# Patient Record
Sex: MALE | Race: WHITE | NOT HISPANIC OR LATINO | Employment: OTHER | URBAN - METROPOLITAN AREA
[De-identification: names, ages, dates, MRNs, and addresses within clinical notes are randomized per-mention and may not be internally consistent; named-entity substitution may affect disease eponyms.]

---

## 2017-03-06 PROBLEM — C61 PROSTATE CANCER: Status: ACTIVE | Noted: 2017-03-06

## 2018-01-15 ENCOUNTER — OFFICE VISIT (OUTPATIENT)
Dept: CARDIOLOGY | Facility: CLINIC | Age: 68
End: 2018-01-15
Attending: INTERNAL MEDICINE
Payer: MEDICARE

## 2018-01-15 VITALS
DIASTOLIC BLOOD PRESSURE: 65 MMHG | HEIGHT: 67 IN | SYSTOLIC BLOOD PRESSURE: 120 MMHG | WEIGHT: 144 LBS | BODY MASS INDEX: 22.6 KG/M2 | HEART RATE: 75 BPM

## 2018-01-15 DIAGNOSIS — I10 ESSENTIAL HYPERTENSION: ICD-10-CM

## 2018-01-15 DIAGNOSIS — C61 PROSTATE CANCER: ICD-10-CM

## 2018-01-15 DIAGNOSIS — E78.00 HYPERCHOLESTEROLEMIA: ICD-10-CM

## 2018-01-15 DIAGNOSIS — B20 HUMAN IMMUNODEFICIENCY VIRUS: ICD-10-CM

## 2018-01-15 PROCEDURE — 99214 OFFICE O/P EST MOD 30 MIN: CPT | Mod: S$GLB,,, | Performed by: INTERNAL MEDICINE

## 2018-01-15 RX ORDER — ROSUVASTATIN CALCIUM 20 MG/1
20 TABLET, COATED ORAL DAILY
Qty: 90 TABLET | Refills: 3 | Status: SHIPPED | OUTPATIENT
Start: 2018-01-15 | End: 2018-03-19

## 2018-01-15 RX ORDER — HYDROCHLOROTHIAZIDE 12.5 MG/1
12.5 CAPSULE ORAL DAILY
Qty: 90 CAPSULE | Refills: 3 | Status: SHIPPED | OUTPATIENT
Start: 2018-01-15 | End: 2018-03-19

## 2018-01-15 NOTE — PROGRESS NOTES
Subjective:     Panchito Lebron is a 67 y.o. male with hypertension and hypercholesterolemia. He has been HIV positive since 1986 and been taking antiviral medications since 2004. In 11/2016 he was diagnosed with low grade prostate cancer. No exertional chest pressure or exertional dyspnea. No palpitations or weak spells. Feeling well overall.    Hypertension   This is a chronic problem. The current episode started more than 1 year ago. The problem is unchanged. The problem is controlled (not been followed at home). Pertinent negatives include no anxiety, blurred vision, chest pain, headaches, malaise/fatigue, neck pain, orthopnea, palpitations, peripheral edema, PND, shortness of breath or sweats. There is no history of chronic renal disease.   Hyperlipidemia   This is a chronic problem. The current episode started more than 1 year ago. The problem is controlled. Recent lipid tests were reviewed and are low. He has no history of chronic renal disease, diabetes, hypothyroidism, liver disease, obesity or nephrotic syndrome. Pertinent negatives include no chest pain, focal sensory loss, focal weakness, leg pain, myalgias or shortness of breath.       Review of Systems   Constitution: Negative for chills and malaise/fatigue.   HENT: Negative for nosebleeds and sore throat.    Eyes: Negative for blurred vision, redness, vision loss in left eye and vision loss in right eye.   Cardiovascular: Negative for chest pain, claudication, dyspnea on exertion, irregular heartbeat, leg swelling, near-syncope, orthopnea, palpitations, paroxysmal nocturnal dyspnea and syncope.   Respiratory: Negative for cough, shortness of breath and wheezing.    Endocrine: Negative for cold intolerance and heat intolerance.   Hematologic/Lymphatic: Negative for bleeding problem. Bruises/bleeds easily (of hands).   Skin: Negative for color change and itching.   Musculoskeletal: Negative for back pain, falls, myalgias and neck pain.  "  Gastrointestinal: Negative for heartburn, hematemesis, hematochezia, hemorrhoids, jaundice, melena, nausea and vomiting.   Genitourinary: Negative for dysuria and hematuria.   Neurological: Negative for difficulty with concentration, dizziness, focal weakness, headaches, light-headedness, loss of balance and vertigo.   Psychiatric/Behavioral: Negative for altered mental status, depression and memory loss. The patient is not nervous/anxious.    Allergic/Immunologic: Positive for persistent infections (HIV positive). Negative for hives.       Current Outpatient Prescriptions on File Prior to Visit   Medication Sig Dispense Refill    alendronate (FOSAMAX) 70 MG tablet Take 70 mg by mouth every 7 days.      amlodipine (NORVASC) 10 MG tablet Take 1 tablet (10 mg total) by mouth once daily. 90 tablet 3    efavirenz (SUSTIVA) 600 mg Tab Take 600 mg by mouth every evening.      esomeprazole (NEXIUM) 40 MG capsule Take 40 mg by mouth before breakfast.      hydrocodone-acetaminophen 7.5-325mg (NORCO) 7.5-325 mg per tablet TK 1 T PO  D PRN  0    lamivudine-zidovudine 150-300mg (COMBIVIR) 150-300 mg Tab Take 1 tablet by mouth every 12 (twelve) hours.      pravastatin (PRAVACHOL) 40 MG tablet Take 1 tablet (40 mg total) by mouth once daily. 90 tablet 3    ramipril (ALTACE) 10 MG capsule Take 1 capsule (10 mg total) by mouth once daily. 90 capsule 3    VITAMIN D3 400 unit tablet TK 2 TS PO QD  3     No current facility-administered medications on file prior to visit.        /65   Pulse 75   Ht 5' 7" (1.702 m)   Wt 65.3 kg (144 lb)   BMI 22.55 kg/m²       Objective:     Physical Exam   Constitutional: He is oriented to person, place, and time. He appears well-developed and well-nourished. He does not appear ill. No distress.   HENT:   Head: Normocephalic and atraumatic.   Nose: Nose normal.   Mouth/Throat: No oropharyngeal exudate.   Eyes: Right eye exhibits no discharge. Left eye exhibits no discharge. Right " conjunctiva is not injected. Left conjunctiva is not injected. Right pupil is round. Left pupil is round. Pupils are equal.   Neck: Neck supple. No JVD present. Carotid bruit is not present. No thyromegaly present.   Cardiovascular: Normal rate, regular rhythm, S1 normal and S2 normal.   No extrasystoles are present. Exam reveals gallop and S4. Exam reveals no S3.    Murmur heard.   Midsystolic murmur is present with a grade of 2/6  at the upper right sternal border  Pulses:       Radial pulses are 2+ on the right side, and 2+ on the left side.        Dorsalis pedis pulses are 2+ on the right side, and 2+ on the left side.        Posterior tibial pulses are 2+ on the right side, and 2+ on the left side.   Pulmonary/Chest: Effort normal and breath sounds normal.   Abdominal: Soft. Normal appearance. There is no hepatosplenomegaly. There is no tenderness.   Musculoskeletal:        Right ankle: He exhibits no swelling, no ecchymosis and no deformity.        Left ankle: He exhibits no swelling, no ecchymosis and no deformity.   Lymphadenopathy:        Head (right side): No submandibular adenopathy present.        Head (left side): No submandibular adenopathy present.     He has no cervical adenopathy.   Neurological: He is alert and oriented to person, place, and time. He is not disoriented. No cranial nerve deficit or sensory deficit.   Skin: Skin is warm, dry and intact. No rash noted. He is not diaphoretic. No cyanosis. Nails show no clubbing.   Psychiatric: He has a normal mood and affect. His speech is normal and behavior is normal. Judgment and thought content normal. Cognition and memory are normal.       Assessment:     1. Essential hypertension    2. Hypercholesterolemia    3. Human immunodeficiency virus    4. Prostate cancer        Plan:     1. Hypertension   2004: Diagnosed.   On amlodipine 10 mg Q24 an ramipril 10 mg Q24.   Keeping log at home.   Running high.   Add hctz 12.5 mg Q24.   BMP in 4  weeks.       2. Hypercholesterolemia   2008: Began statin.   12/13/2013: Chol 184. HDL 51. LDL 98. .   On pravastatin 40 mg Q24.   7/10/2014: Chol 183. HDL 47. LDL 97. .   2/24/2015: Chol 184. HDL 42. LDL 82. .   5/1/2016: Chol 164. HDL 45. LDL 73. .   On pravastatin 40 mg Q24.   Change to rosuvastatin 20 mg Q24.   Do lipid panel in 4 weeks.   Well controlled.     3. Human Immunodeficiency Virus   3/1986: Diagnosed.   On medications.   9/26/2014: RNA undetectable. CD4 645.   11/15/2016: CD4 676.   Dr. Blue Dick.   Dr. Devang Siu.    4. Prostate Cancer   11/2016: Diagnosed. [place].   Dr. Anshul Mccrary.    5. Primary Care   Dr. Blue Dick.    F/u 2 months.    Padilla Sprague M.D.

## 2018-01-26 ENCOUNTER — TELEPHONE (OUTPATIENT)
Dept: CARDIOLOGY | Facility: CLINIC | Age: 68
End: 2018-01-26

## 2018-01-26 NOTE — TELEPHONE ENCOUNTER
Patient has not taken HCTZ and Rosuvastatin today. He says since he started the medicines has felt wobbly.    Please call the patient to discuss.

## 2018-01-26 NOTE — TELEPHONE ENCOUNTER
----- Message from Faiza Craig sent at 1/26/2018 10:18 AM CST -----  Having problems with the medications such as feeling off balance and having other symptoms [phone]

## 2018-03-19 ENCOUNTER — OFFICE VISIT (OUTPATIENT)
Dept: CARDIOLOGY | Facility: CLINIC | Age: 68
End: 2018-03-19
Attending: INTERNAL MEDICINE
Payer: MEDICARE

## 2018-03-19 VITALS
SYSTOLIC BLOOD PRESSURE: 134 MMHG | HEART RATE: 70 BPM | DIASTOLIC BLOOD PRESSURE: 66 MMHG | BODY MASS INDEX: 22.29 KG/M2 | WEIGHT: 142 LBS | HEIGHT: 67 IN

## 2018-03-19 DIAGNOSIS — E78.00 HYPERCHOLESTEROLEMIA: ICD-10-CM

## 2018-03-19 DIAGNOSIS — B20 HUMAN IMMUNODEFICIENCY VIRUS: ICD-10-CM

## 2018-03-19 DIAGNOSIS — I10 ESSENTIAL HYPERTENSION: ICD-10-CM

## 2018-03-19 DIAGNOSIS — C61 PROSTATE CANCER: ICD-10-CM

## 2018-03-19 PROCEDURE — 99214 OFFICE O/P EST MOD 30 MIN: CPT | Mod: S$GLB,,, | Performed by: INTERNAL MEDICINE

## 2018-03-19 RX ORDER — PRAVASTATIN SODIUM 40 MG/1
40 TABLET ORAL DAILY
Qty: 90 TABLET | Refills: 3 | Status: SHIPPED | OUTPATIENT
Start: 2018-03-19 | End: 2018-03-19

## 2018-03-19 RX ORDER — ROSUVASTATIN CALCIUM 20 MG/1
20 TABLET, COATED ORAL DAILY
Qty: 90 TABLET | Refills: 3 | Status: SHIPPED | OUTPATIENT
Start: 2018-03-19 | End: 2019-03-19

## 2018-03-19 RX ORDER — AMLODIPINE BESYLATE 10 MG/1
10 TABLET ORAL DAILY
Qty: 90 TABLET | Refills: 3 | Status: SHIPPED | OUTPATIENT
Start: 2018-03-19

## 2018-03-19 RX ORDER — RAMIPRIL 10 MG/1
10 CAPSULE ORAL DAILY
Qty: 90 CAPSULE | Refills: 3 | Status: SHIPPED | OUTPATIENT
Start: 2018-03-19

## 2018-03-19 NOTE — PROGRESS NOTES
Subjective:     Panchito Lebron is a 67 y.o. male with hypertension and hypercholesterolemia. He has been HIV positive since 1986 and been taking antiviral medications since 2004. In 11/2016 he was diagnosed with low grade prostate cancer. No exertional chest pressure or exertional dyspnea. No palpitations or weak spells. Feeling well overall.    Hypertension   This is a chronic problem. The current episode started more than 1 year ago. The problem is unchanged. The problem is controlled (not [facility] Pertinent negatives include no anxiety, blurred vision, chest pain, headaches, malaise/fatigue, neck pain, orthopnea, palpitations, peripheral edema, PND, shortness of breath or sweats. There is no history of chronic renal disease.   Hyperlipidemia   This is a chronic problem. The current episode started more than 1 year ago. The problem is controlled. Recent lipid tests were reviewed and are low. He has no history of chronic renal disease, diabetes, hypothyroidism, liver disease, obesity or nephrotic syndrome. Pertinent negatives include no chest pain, focal sensory loss, focal weakness, leg pain, myalgias or shortness of breath.       Review of Systems   Constitution: Negative for chills and malaise/fatigue.   HENT: Negative for nosebleeds and sore throat.    Eyes: Negative for blurred vision, redness, vision loss in left eye and vision loss in right eye.   Cardiovascular: Negative for chest pain, claudication, dyspnea on exertion, irregular heartbeat, leg swelling, near-syncope, orthopnea, palpitations, paroxysmal nocturnal dyspnea and syncope.   Respiratory: Negative for cough, shortness of breath and wheezing.    Endocrine: Negative for cold intolerance and heat intolerance.   Hematologic/Lymphatic: Negative for bleeding problem. Bruises/bleeds easily (of hands).   Skin: Negative for color change and itching.   Musculoskeletal: Negative for back pain, falls,  "myalgias and neck pain.   Gastrointestinal: Negative for heartburn, hematemesis, hematochezia, hemorrhoids, jaundice, melena, nausea and vomiting.   Genitourinary: Negative for dysuria and hematuria.   Neurological: Negative for difficulty with concentration, dizziness, focal weakness, headaches, light-headedness, loss of balance, tremors and vertigo.   Psychiatric/Behavioral: Negative for altered mental status, depression and memory loss. The patient is not nervous/anxious.    Allergic/Immunologic: Positive for persistent infections (HIV positive). Negative for hives.       Current Outpatient Prescriptions on File Prior to Visit   Medication Sig Dispense Refill    alendronate (FOSAMAX) 70 MG tablet Take 70 mg by mouth every 7 days.      amlodipine (NORVASC) 10 MG tablet Take 1 tablet (10 mg total) by mouth once daily. 90 tablet 3    dolutegravir (TIVICAY) 50 mg Tab Take 50 mg by mouth once daily.      emtricitabine-tenofovir alafen (DESCOVY) 200-25 mg Tab Take by mouth.      esomeprazole (NEXIUM) 40 MG capsule Take 40 mg by mouth before breakfast.      hydroCHLOROthiazide (MICROZIDE) 12.5 mg capsule Take 1 capsule (12.5 mg total) by mouth once daily. 90 capsule 3    hydrocodone-acetaminophen 7.5-325mg (NORCO) 7.5-325 mg per tablet TK 1 T PO  D PRN  0    ramipril (ALTACE) 10 MG capsule Take 1 capsule (10 mg total) by mouth once daily. 90 capsule 3    rosuvastatin (CRESTOR) 20 MG tablet Take 1 tablet (20 mg total) by mouth once daily. 90 tablet 3    VITAMIN D3 400 unit tablet TK 2 TS PO QD  3     No current facility-administered medications on file prior to visit.        /66   Pulse 70   Ht 5' 7" (1.702 m)   Wt 64.4 kg (142 lb)   BMI 22.24 kg/m²       Objective:     Physical Exam   Constitutional: He is oriented to person, place, and time. He appears well-developed and well-nourished. He does not appear ill. No distress.   HENT:   Head: Normocephalic and atraumatic.   Nose: Nose normal. "   Mouth/Throat: No oropharyngeal exudate.   Eyes: Right eye exhibits no discharge. Left eye exhibits no discharge. Right conjunctiva is not injected. Left conjunctiva is not injected. Right pupil is round. Left pupil is round. Pupils are equal.   Neck: Neck supple. No JVD present. Carotid bruit is not present. No thyromegaly present.   Cardiovascular: Normal rate, regular rhythm, S1 normal and S2 normal.   No extrasystoles are present. Exam reveals gallop and S4. Exam reveals no S3.    Murmur heard.   Midsystolic murmur is present with a grade of 2/6  at the upper right sternal border  Pulses:       Radial pulses are 2+ on the right side, and 2+ on the left side.        Dorsalis pedis pulses are 2+ on the right side, and 2+ on the left side.        Posterior tibial pulses are 2+ on the right side, and 2+ on the left side.   Pulmonary/Chest: Effort normal and breath sounds normal.   Abdominal: Soft. Normal appearance. There is no hepatosplenomegaly. There is no tenderness.   Musculoskeletal:        Right ankle: He exhibits no swelling, no ecchymosis and no deformity.        Left ankle: He exhibits no swelling, no ecchymosis and no deformity.   Lymphadenopathy:        Head (right side): No submandibular adenopathy present.        Head (left side): No submandibular adenopathy present.     He has no cervical adenopathy.   Neurological: He is alert and oriented to person, place, and time. He is not disoriented. No cranial nerve deficit or sensory deficit.   Skin: Skin is warm, dry and intact. No rash noted. He is not diaphoretic. No cyanosis. Nails show no clubbing.   Psychiatric: He has a normal mood and affect. His speech is normal and behavior is normal. Judgment and thought content normal. Cognition and memory are normal.       Assessment:     1. Essential hypertension    2. Hypercholesterolemia    3. Human immunodeficiency virus    4. Prostate cancer        Plan:     1. Hypertension   2004: Diagnosed.   On  amlodipine 10 mg Q24 and ramipril 10 mg Q24.   Stopped hctz 12.5 mg Q24 due to possible side effects from hctz.   Keeping log at home.   Running slightly high.       2. Hypercholesterolemia   2008: Began statin.   12/13/2013: Chol 184. HDL 51. LDL 98. .   On pravastatin 40 mg Q24.   7/10/2014: Chol 183. HDL 47. LDL 97. .   2/24/2015: Chol 184. HDL 42. LDL 82. .   5/1/2016: Chol 164. HDL 45. LDL 73. .   On pravastatin 40 mg Q24.   Changed to rosuvastatin 20 mg Q24.   1/18/2018: Chol 150. HDL 41. LDL 60 .   Changed back to pravastatin 40 mg Q24 due to possible side effects from rosuvastatin.   Fairly well controlled.     3. Human Immunodeficiency Virus   3/1986: Diagnosed.   On medications.   9/26/2014: RNA undetectable. CD4 645.   11/15/2016: CD4 676.   Dr. Blue Dick.   Dr. Devang Siu.    4. Prostate Cancer   11/2016: Diagnosed. [place] 6.   Dr. Anshul Mccrary.    5. Primary Care   Dr. Blue Dick.    F/u 4 months.    Padilla Sprague M.D.

## 2018-07-31 ENCOUNTER — OFFICE VISIT (OUTPATIENT)
Dept: CARDIOLOGY | Facility: CLINIC | Age: 68
End: 2018-07-31
Attending: INTERNAL MEDICINE
Payer: MEDICARE

## 2018-07-31 VITALS
BODY MASS INDEX: 22.6 KG/M2 | SYSTOLIC BLOOD PRESSURE: 130 MMHG | WEIGHT: 144 LBS | DIASTOLIC BLOOD PRESSURE: 65 MMHG | HEIGHT: 67 IN | HEART RATE: 61 BPM

## 2018-07-31 DIAGNOSIS — E78.00 HYPERCHOLESTEROLEMIA: ICD-10-CM

## 2018-07-31 DIAGNOSIS — B20 HUMAN IMMUNODEFICIENCY VIRUS: ICD-10-CM

## 2018-07-31 DIAGNOSIS — I10 ESSENTIAL HYPERTENSION: ICD-10-CM

## 2018-07-31 DIAGNOSIS — C61 PROSTATE CANCER: ICD-10-CM

## 2018-07-31 PROCEDURE — 99214 OFFICE O/P EST MOD 30 MIN: CPT | Mod: S$GLB,,, | Performed by: INTERNAL MEDICINE

## 2018-07-31 PROCEDURE — 93000 ELECTROCARDIOGRAM COMPLETE: CPT | Mod: S$GLB,,, | Performed by: INTERNAL MEDICINE

## 2018-07-31 RX ORDER — CARVEDILOL 3.12 MG/1
3.12 TABLET ORAL 2 TIMES DAILY
Qty: 180 TABLET | Refills: 3 | Status: SHIPPED | OUTPATIENT
Start: 2018-07-31 | End: 2021-01-21

## 2018-07-31 NOTE — PROGRESS NOTES
Subjective:     Panchito Lebron is a 68 y.o. male with hypertension and hypercholesterolemia. He has been HIV positive since 1986 and been taking antiviral medications since 2004. In 11/2016 he was diagnosed with low grade prostate cancer. No exertional chest pressure or exertional dyspnea. No palpitations or weak spells. Very vague arthritic pain. Feeling well overall.    Hypertension   This is a chronic problem. The current episode started more than 1 year ago. The problem is unchanged. The problem is controlled (not [facility] Pertinent negatives include no anxiety, blurred vision, chest pain, headaches, malaise/fatigue, neck pain, orthopnea, palpitations, peripheral edema, PND, shortness of breath or sweats. There is no history of chronic renal disease.   Hyperlipidemia   This is a chronic problem. The current episode started more than 1 year ago. The problem is controlled. Recent lipid tests were reviewed and are low. He has no history of chronic renal disease, diabetes, hypothyroidism, liver disease, obesity or nephrotic syndrome. Pertinent negatives include no chest pain, focal sensory loss, focal weakness, leg pain, myalgias or shortness of breath.       Review of Systems   Constitution: Negative for chills and malaise/fatigue.   HENT: Negative for nosebleeds and sore throat.    Eyes: Negative for blurred vision, redness, vision loss in left eye and vision loss in right eye.   Cardiovascular: Negative for chest pain, claudication, dyspnea on exertion, irregular heartbeat, leg swelling, near-syncope, orthopnea, palpitations, paroxysmal nocturnal dyspnea and syncope.   Respiratory: Negative for cough, shortness of breath and wheezing.    Endocrine: Negative for cold intolerance and heat intolerance.   Hematologic/Lymphatic: Negative for bleeding problem. Bruises/bleeds easily (of hands).   Skin: Negative for color change and itching.   Musculoskeletal: Negative  "for back pain, falls, myalgias and neck pain.   Gastrointestinal: Negative for heartburn, hematemesis, hematochezia, hemorrhoids, jaundice, melena, nausea and vomiting.   Genitourinary: Negative for dysuria and hematuria.   Neurological: Negative for difficulty with concentration, dizziness, focal weakness, headaches, light-headedness, loss of balance, tremors and vertigo.   Psychiatric/Behavioral: Negative for altered mental status, depression and memory loss. The patient is not nervous/anxious.    Allergic/Immunologic: Positive for persistent infections (HIV positive). Negative for hives.       Current Outpatient Prescriptions on File Prior to Visit   Medication Sig Dispense Refill    alendronate (FOSAMAX) 70 MG tablet Take 70 mg by mouth every 7 days.      amLODIPine (NORVASC) 10 MG tablet Take 1 tablet (10 mg total) by mouth once daily. 90 tablet 3    dolutegravir (TIVICAY) 50 mg Tab Take 50 mg by mouth once daily.      emtricitabine-tenofovir alafen (DESCOVY) 200-25 mg Tab Take by mouth.      esomeprazole (NEXIUM) 40 MG capsule Take 40 mg by mouth before breakfast.      hydrocodone-acetaminophen 7.5-325mg (NORCO) 7.5-325 mg per tablet TK 1 T PO  D PRN  0    ramipril (ALTACE) 10 MG capsule Take 1 capsule (10 mg total) by mouth once daily. 90 capsule 3    rosuvastatin (CRESTOR) 20 MG tablet Take 1 tablet (20 mg total) by mouth once daily. 90 tablet 3    VITAMIN D3 400 unit tablet TK 2 TS PO QD  3     No current facility-administered medications on file prior to visit.        /65   Pulse 61   Ht 5' 7" (1.702 m)   Wt 65.3 kg (144 lb)   BMI 22.55 kg/m²       Objective:     Physical Exam   Constitutional: He is oriented to person, place, and time. He appears well-developed and well-nourished. He does not appear ill. No distress.   HENT:   Head: Normocephalic and atraumatic.   Nose: Nose normal.   Mouth/Throat: No oropharyngeal exudate.   Eyes: Right eye exhibits no discharge. Left eye exhibits no " discharge. Right conjunctiva is not injected. Left conjunctiva is not injected. Right pupil is round. Left pupil is round. Pupils are equal.   Neck: Neck supple. No JVD present. Carotid bruit is not present. No thyromegaly present.   Cardiovascular: Normal rate, regular rhythm, S1 normal and S2 normal.   No extrasystoles are present. Exam reveals gallop and S4. Exam reveals no S3.    Murmur heard.   Midsystolic murmur is present with a grade of 2/6  at the upper right sternal border  Pulses:       Radial pulses are 2+ on the right side, and 2+ on the left side.        Dorsalis pedis pulses are 2+ on the right side, and 2+ on the left side.        Posterior tibial pulses are 2+ on the right side, and 2+ on the left side.   Pulmonary/Chest: Effort normal and breath sounds normal.   Abdominal: Soft. Normal appearance. There is no hepatosplenomegaly. There is no tenderness.   Musculoskeletal:        Right ankle: He exhibits no swelling, no ecchymosis and no deformity.        Left ankle: He exhibits no swelling, no ecchymosis and no deformity.   Lymphadenopathy:        Head (right side): No submandibular adenopathy present.        Head (left side): No submandibular adenopathy present.     He has no cervical adenopathy.   Neurological: He is alert and oriented to person, place, and time. He is not disoriented. No cranial nerve deficit or sensory deficit.   Skin: Skin is warm, dry and intact. No rash noted. He is not diaphoretic. No cyanosis. Nails show no clubbing.   Psychiatric: He has a normal mood and affect. His speech is normal and behavior is normal. Judgment and thought content normal. Cognition and memory are normal.       Assessment:     1. Essential hypertension    2. Hypercholesterolemia    3. Human immunodeficiency virus    4. Prostate cancer        Plan:     1. Hypertension   2004: Diagnosed.   On amlodipine 10 mg Q24 and ramipril 10 mg Q24.   Stopped hctz 12.5 mg Q24 due to possible side effects from  hctz.   Keeping log at home.   Running slightly high.   May consider carvedilol 3.125 mg Q12.        2. Hypercholesterolemia   2008: Began statin.   12/13/2013: Chol 184. HDL 51. LDL 98. .   On pravastatin 40 mg Q24.   7/10/2014: Chol 183. HDL 47. LDL 97. .   2/24/2015: Chol 184. HDL 42. LDL 82. .   5/1/2016: Chol 164. HDL 45. LDL 73. .   On pravastatin 40 mg Q24.   On rosuvastatin 20 mg Q24.   1/18/2018: Chol 150. HDL 41. LDL 60. .   On rosuvastatin 20 mg Q24.   Fairly well controlled.     3. Human Immunodeficiency Virus   3/1986: Diagnosed.   On medications.   9/26/2014: RNA undetectable. CD4 645.   11/15/2016: CD4 676.   Dr. Blue Dick.   Dr. Devang Siu.    4. Prostate Cancer   11/2016: Diagnosed. [place] 6.   Dr. Anshul Mccrary.    5. Primary Care   Dr. Blue Dick.    F/u 4 months.    Padilla Sprague M.D.

## 2018-09-10 ENCOUNTER — OFFICE VISIT (OUTPATIENT)
Dept: UROLOGY | Facility: CLINIC | Age: 68
End: 2018-09-10
Attending: UROLOGY
Payer: MEDICARE

## 2018-09-10 VITALS
SYSTOLIC BLOOD PRESSURE: 152 MMHG | WEIGHT: 143.94 LBS | DIASTOLIC BLOOD PRESSURE: 80 MMHG | BODY MASS INDEX: 22.59 KG/M2 | HEART RATE: 74 BPM | HEIGHT: 67 IN

## 2018-09-10 DIAGNOSIS — C61 PROSTATE CANCER: Primary | ICD-10-CM

## 2018-09-10 LAB
BILIRUB SERPL-MCNC: ABNORMAL MG/DL
BLOOD URINE, POC: ABNORMAL
COLOR, POC UA: ABNORMAL
GLUCOSE UR QL STRIP: NORMAL
KETONES UR QL STRIP: ABNORMAL
LEUKOCYTE ESTERASE URINE, POC: ABNORMAL
NITRITE, POC UA: ABNORMAL
PH, POC UA: 5
PROTEIN, POC: ABNORMAL
SPECIFIC GRAVITY, POC UA: 1
UROBILINOGEN, POC UA: NORMAL

## 2018-09-10 PROCEDURE — 81002 URINALYSIS NONAUTO W/O SCOPE: CPT | Mod: S$GLB,,, | Performed by: UROLOGY

## 2018-09-10 PROCEDURE — 99213 OFFICE O/P EST LOW 20 MIN: CPT | Mod: 25,S$GLB,, | Performed by: UROLOGY

## 2018-09-10 RX ORDER — CIPROFLOXACIN 500 MG/1
500 TABLET ORAL 2 TIMES DAILY
Qty: 4 TABLET | Refills: 0 | Status: SHIPPED | OUTPATIENT
Start: 2018-09-10 | End: 2018-09-12

## 2018-09-24 ENCOUNTER — PROCEDURE VISIT (OUTPATIENT)
Dept: UROLOGY | Facility: CLINIC | Age: 68
End: 2018-09-24
Attending: UROLOGY
Payer: MEDICARE

## 2018-09-24 VITALS
BODY MASS INDEX: 22.58 KG/M2 | HEIGHT: 67 IN | HEART RATE: 71 BPM | WEIGHT: 143.88 LBS | DIASTOLIC BLOOD PRESSURE: 74 MMHG | SYSTOLIC BLOOD PRESSURE: 140 MMHG

## 2018-09-24 DIAGNOSIS — C61 PROSTATE CANCER: Primary | ICD-10-CM

## 2018-09-24 LAB
BILIRUB SERPL-MCNC: ABNORMAL MG/DL
BLOOD URINE, POC: ABNORMAL
COLOR, POC UA: ABNORMAL
GLUCOSE UR QL STRIP: NORMAL
KETONES UR QL STRIP: ABNORMAL
LEUKOCYTE ESTERASE URINE, POC: ABNORMAL
NITRITE, POC UA: NORMAL
PH, POC UA: 5
PROTEIN, POC: ABNORMAL
SPECIFIC GRAVITY, POC UA: 1.01
UROBILINOGEN, POC UA: NORMAL

## 2018-09-24 PROCEDURE — 76872 US TRANSRECTAL: CPT | Mod: S$GLB,,, | Performed by: UROLOGY

## 2018-09-24 PROCEDURE — 81002 URINALYSIS NONAUTO W/O SCOPE: CPT | Mod: S$GLB,,, | Performed by: UROLOGY

## 2018-09-24 PROCEDURE — 88305 TISSUE EXAM BY PATHOLOGIST: CPT | Mod: 59 | Performed by: PATHOLOGY

## 2018-09-24 PROCEDURE — 96372 THER/PROPH/DIAG INJ SC/IM: CPT | Mod: 59,S$GLB,, | Performed by: UROLOGY

## 2018-09-24 PROCEDURE — 88305 TISSUE EXAM BY PATHOLOGIST: CPT | Mod: 26,,, | Performed by: PATHOLOGY

## 2018-09-24 PROCEDURE — 55700 TRANSRECTAL ULTRASOUND W/ BIOPSY: CPT | Mod: S$GLB,,, | Performed by: UROLOGY

## 2018-09-24 PROCEDURE — 76942 ECHO GUIDE FOR BIOPSY: CPT | Mod: 59,S$GLB,, | Performed by: UROLOGY

## 2018-09-24 RX ORDER — GENTAMICIN SULFATE 40 MG/ML
80 INJECTION, SOLUTION INTRAMUSCULAR; INTRAVENOUS
Status: COMPLETED | OUTPATIENT
Start: 2018-09-24 | End: 2018-09-24

## 2018-09-24 RX ORDER — LIDOCAINE HYDROCHLORIDE 20 MG/ML
JELLY TOPICAL
Status: COMPLETED | OUTPATIENT
Start: 2018-09-24 | End: 2018-09-24

## 2018-09-24 RX ORDER — LIDOCAINE HYDROCHLORIDE 10 MG/ML
1 INJECTION INFILTRATION; PERINEURAL
Status: COMPLETED | OUTPATIENT
Start: 2018-09-24 | End: 2018-09-24

## 2018-09-24 RX ADMIN — LIDOCAINE HYDROCHLORIDE 5 ML: 20 JELLY TOPICAL at 03:09

## 2018-09-24 RX ADMIN — LIDOCAINE HYDROCHLORIDE 10 ML: 10 INJECTION INFILTRATION; PERINEURAL at 04:09

## 2018-09-24 RX ADMIN — GENTAMICIN SULFATE 80 MG: 40 INJECTION, SOLUTION INTRAMUSCULAR; INTRAVENOUS at 03:09

## 2018-09-24 NOTE — PROCEDURES
Transrectal Ultrasound w/ Biopsy  Date/Time: 9/24/2018 3:57 PM  Performed by: Anshul Mccrary MD  Authorized by: Anshul Mccrary MD     Consent Done?:  Yes (Written)  Indications: Prostate Cancer    Preparation: Patient was prepped and draped in usual sterile fashion    Position:  Left lateral  Anesthesia:  10cc's 1% Lidocaine  Patient sedated: No    Prostate Size:  28.8 cm^3 (PSAD 0.09)  Left [place] Biopsies: 2  Left Mid Biopsies: 2  Left [place] Biopsies: 2  Right Base Biopsies: 2  Right Mid Biopsies: 2  Right [place] Biopsies: 2  Transitional zone: No    Total Biopsies:  12    Patient tolerance:  Patient tolerated the procedure well with no immediate complications    FU 1 week to review

## 2018-10-01 ENCOUNTER — OFFICE VISIT (OUTPATIENT)
Dept: UROLOGY | Facility: CLINIC | Age: 68
End: 2018-10-01
Attending: UROLOGY
Payer: MEDICARE

## 2018-10-01 VITALS
HEIGHT: 67 IN | BODY MASS INDEX: 22.56 KG/M2 | HEART RATE: 71 BPM | DIASTOLIC BLOOD PRESSURE: 66 MMHG | SYSTOLIC BLOOD PRESSURE: 143 MMHG | WEIGHT: 143.75 LBS

## 2018-10-01 DIAGNOSIS — C61 PROSTATE CANCER: Primary | ICD-10-CM

## 2018-10-01 PROCEDURE — 99214 OFFICE O/P EST MOD 30 MIN: CPT | Mod: S$GLB,,, | Performed by: UROLOGY

## 2018-10-01 NOTE — PROGRESS NOTES
"Subjective:      Panchito Lebron is a 68 y.o. male who returns today regarding his prostate cancer.    Last visit on 10/31/2016 for prostate biopsy = [place] 3+3 in 4 cores. Results and option discussed on the phone. He was subsequently lost to FU.     Underwent repeat biopsy last week. Here to review results. No c/o.    The following portions of the patient's history were reviewed and updated as appropriate: allergies, current medications, past family history, past medical history, past social history, past surgical history and problem list.    Review of Systems  A comprehensive multipoint review of systems was negative except as otherwise stated in the HPI.     Objective:   Vitals: BP (!) 143/66 (BP Location: Left arm, Patient Position: Sitting, BP Method: Large (Automatic))   Pulse 71   Ht 5' 7" (1.702 m)   Wt 65.2 kg (143 lb 11.8 oz)   BMI 22.51 kg/m²     Physical Exam   General: alert and oriented, no acute distress  Respiratory: Symmetric expansion, non-labored breathing  Neuro: no gross deficits  Psych: normal judgment and insight, normal mood/affect and non-anxious    Lab Review   Urinalysis demonstrates negative for all components    PSA: (pt provided)  1/18/17 2.6  5/18/17 2.6  9/22/17 3.0  1/23/18 3.0  5/24/18 2.7    Pathology   Prostate Biopsy: [place]aid 3+3=6, 3 cores, up to 25% of core    Assessment and Plan:   1. Prostate cancer  -- Discussed AUA guidelines. With repeat biopsy and recent PSA levels, remains with very low risk prostate cancer for which active surveillance is recommended treatment.  -- Will need PSA every 6 mos and FU with me at least once per year.  -- Will consider repeat biopsy in the future if PSA rises    I spent over 25 minutes with the patient. Over 50% of the visit was spent in counseling and coordination of care.     "

## 2019-11-11 ENCOUNTER — OFFICE VISIT (OUTPATIENT)
Dept: UROLOGY | Facility: CLINIC | Age: 69
End: 2019-11-11
Attending: UROLOGY
Payer: MEDICARE

## 2019-11-11 VITALS
WEIGHT: 143 LBS | BODY MASS INDEX: 22.98 KG/M2 | SYSTOLIC BLOOD PRESSURE: 128 MMHG | DIASTOLIC BLOOD PRESSURE: 69 MMHG | HEIGHT: 66 IN | HEART RATE: 59 BPM

## 2019-11-11 DIAGNOSIS — C61 PROSTATE CANCER: Primary | ICD-10-CM

## 2019-11-11 LAB
BILIRUB SERPL-MCNC: ABNORMAL MG/DL
BLOOD URINE, POC: ABNORMAL
COLOR, POC UA: ABNORMAL
GLUCOSE UR QL STRIP: NORMAL
KETONES UR QL STRIP: ABNORMAL
LEUKOCYTE ESTERASE URINE, POC: ABNORMAL
NITRITE, POC UA: ABNORMAL
PH, POC UA: 5
PROTEIN, POC: ABNORMAL
SPECIFIC GRAVITY, POC UA: 1.01
UROBILINOGEN, POC UA: NORMAL

## 2019-11-11 PROCEDURE — 99214 OFFICE O/P EST MOD 30 MIN: CPT | Mod: 25,S$GLB,, | Performed by: UROLOGY

## 2019-11-11 PROCEDURE — 99214 PR OFFICE/OUTPT VISIT, EST, LEVL IV, 30-39 MIN: ICD-10-PCS | Mod: 25,S$GLB,, | Performed by: UROLOGY

## 2019-11-11 PROCEDURE — 81002 URINALYSIS NONAUTO W/O SCOPE: CPT | Mod: S$GLB,,, | Performed by: UROLOGY

## 2019-11-11 PROCEDURE — 81002 POCT URINE DIPSTICK WITHOUT MICROSCOPE: ICD-10-PCS | Mod: S$GLB,,, | Performed by: UROLOGY

## 2019-11-11 RX ORDER — ATORVASTATIN CALCIUM 40 MG/1
40 TABLET, FILM COATED ORAL DAILY
COMMUNITY

## 2019-11-11 RX ORDER — MULTIVITAMIN
1 TABLET ORAL DAILY
COMMUNITY

## 2019-11-11 RX ORDER — ASPIRIN 81 MG/1
81 TABLET ORAL DAILY
COMMUNITY

## 2019-11-11 NOTE — PROGRESS NOTES
"Subjective:      Panchito Lebron is a 69 y.o. male who returns today regarding his prostate cancer.    Last visit on 10/31/2016 for prostate biopsy = [place] 3+3 in 4 cores. Results and option discussed on the phone. He was subsequently lost to FU.     Underwent confirmatory biopsy in September 2018 with similar results. Remains on AS.     No concerns and c/o today. Overall doing well.    The following portions of the patient's history were reviewed and updated as appropriate: allergies, current medications, past family history, past medical history, past social history, past surgical history and problem list.    Review of Systems  A comprehensive multipoint review of systems was negative except as otherwise stated in the HPI.     Objective:   Vitals: /69 (BP Location: Right arm, Patient Position: Sitting)   Pulse (!) 59   Ht 5' 6" (1.676 m)   Wt 64.9 kg (143 lb)   BMI 23.08 kg/m²     Physical Exam   General: alert and oriented, no acute distress  Respiratory: Symmetric expansion, non-labored breathing  Neuro: no gross deficits  Psych: normal judgment and insight, normal mood/affect and non-anxious    Lab Review   Urinalysis demonstrates negative for all components    PSA: (pt provided)  1/18/17 2.6  5/18/17 2.6  9/22/17 3.0  1/23/18 3.0  5/24/18 2.7  12/12/18 3.7  6/18/19 2.8    Pathology   Prostate Biopsy (9/24/18): [place] 3+3=6, 3 cores, up to 25% of core    Assessment and Plan:   1. Prostate cancer  -- Discussed AUA guidelines. With repeat biopsy and recent PSA levels, remains with very low risk prostate cancer for which active surveillance is recommended treatment.  -- Will need PSA every 6 mos and FU with me at least once per year (labs orders per PCP)  -- Will consider repeat biopsy in the future if PSA rises    I spent over 25 minutes with the patient. Over 50% of the visit was spent in counseling and coordination of care.     "

## 2020-11-16 ENCOUNTER — OFFICE VISIT (OUTPATIENT)
Dept: UROLOGY | Facility: CLINIC | Age: 70
End: 2020-11-16
Attending: UROLOGY
Payer: MEDICARE

## 2020-11-16 VITALS
HEIGHT: 66 IN | WEIGHT: 143 LBS | SYSTOLIC BLOOD PRESSURE: 155 MMHG | HEART RATE: 61 BPM | BODY MASS INDEX: 22.98 KG/M2 | DIASTOLIC BLOOD PRESSURE: 73 MMHG

## 2020-11-16 DIAGNOSIS — C61 PROSTATE CANCER: Primary | ICD-10-CM

## 2020-11-16 LAB
BILIRUB SERPL-MCNC: ABNORMAL MG/DL
BLOOD URINE, POC: ABNORMAL
CLARITY, POC UA: CLEAR
COLOR, POC UA: YELLOW
GLUCOSE UR QL STRIP: NORMAL
KETONES UR QL STRIP: ABNORMAL
LEUKOCYTE ESTERASE URINE, POC: ABNORMAL
NITRITE, POC UA: ABNORMAL
PH, POC UA: 5
PROTEIN, POC: ABNORMAL
UROBILINOGEN, POC UA: NORMAL

## 2020-11-16 PROCEDURE — 81002 URINALYSIS NONAUTO W/O SCOPE: CPT | Mod: S$GLB,,, | Performed by: UROLOGY

## 2020-11-16 PROCEDURE — 81002 POCT URINE DIPSTICK WITHOUT MICROSCOPE: ICD-10-PCS | Mod: S$GLB,,, | Performed by: UROLOGY

## 2020-11-16 PROCEDURE — 99214 OFFICE O/P EST MOD 30 MIN: CPT | Mod: 25,S$GLB,, | Performed by: UROLOGY

## 2020-11-16 PROCEDURE — 99214 PR OFFICE/OUTPT VISIT, EST, LEVL IV, 30-39 MIN: ICD-10-PCS | Mod: 25,S$GLB,, | Performed by: UROLOGY

## 2020-11-16 NOTE — PROGRESS NOTES
"Subjective:      Panchito Lebron is a 70 y.o. male who returns today regarding his prostate cancer.    Initial prostate biopsy (10/2016) = [place] 3+3 in 4 cores. Results and option discussed on the phone. He was subsequently lost to FU.     Underwent confirmatory biopsy in September 2018 with similar results. Remains on AS.     No concerns and c/o today. Overall doing well.    The following portions of the patient's history were reviewed and updated as appropriate: allergies, current medications, past family history, past medical history, past social history, past surgical history and problem list.    Review of Systems  A comprehensive multipoint review of systems was negative except as otherwise stated in the HPI.     Objective:   Vitals: BP (!) 155/73 (BP Location: Right arm, Patient Position: Sitting, BP Method: Large (Automatic))   Pulse 61   Ht 5' 6" (1.676 m)   Wt 64.9 kg (143 lb)   BMI 23.08 kg/m²     Physical Exam   General: alert and oriented, no acute distress  Respiratory: Symmetric expansion, non-labored breathing  Neuro: no gross deficits  Psych: normal judgment and insight, normal mood/affect and non-anxious    Lab Review   Urinalysis demonstrates negative for all components    PSA: (pt provided)  1/18/17 2.6  5/18/17 2.6  9/22/17 3.0  1/23/18 3.0  5/24/18 2.7  12/12/18 3.7  6/18/19 2.8  12/13/19 3.2  7/1/20  4.2    Pathology   Prostate Biopsy (9/24/18): [place] 3+3=6, 3 cores, up to 25% of core    Assessment and Plan:   1. Prostate cancer  -- Discussed AUA guidelines. With repeat biopsy and recent PSA levels, remains with low risk prostate cancer for which active surveillance is recommended treatment.  -- Next PSA draw in January, will schedule f/u with me shortly afterward  -- Will consider MRI and repeat biopsy in the future if PSA rises    I spent over 25 minutes with the patient. Over 50% of the visit was spent in counseling and coordination of care.       "

## 2021-01-14 DIAGNOSIS — N32.9 BLADDER DISORDER: ICD-10-CM

## 2021-01-14 DIAGNOSIS — C61 PROSTATE CANCER: Primary | ICD-10-CM

## 2021-01-21 ENCOUNTER — OFFICE VISIT (OUTPATIENT)
Dept: UROLOGY | Facility: CLINIC | Age: 71
End: 2021-01-21
Attending: UROLOGY
Payer: MEDICARE

## 2021-01-21 VITALS
HEART RATE: 62 BPM | WEIGHT: 143.06 LBS | DIASTOLIC BLOOD PRESSURE: 70 MMHG | BODY MASS INDEX: 22.99 KG/M2 | HEIGHT: 66 IN | SYSTOLIC BLOOD PRESSURE: 145 MMHG

## 2021-01-21 DIAGNOSIS — C61 PROSTATE CANCER: Primary | ICD-10-CM

## 2021-01-21 PROCEDURE — 99213 OFFICE O/P EST LOW 20 MIN: CPT | Mod: S$GLB,,, | Performed by: UROLOGY

## 2021-01-21 PROCEDURE — 99213 PR OFFICE/OUTPT VISIT, EST, LEVL III, 20-29 MIN: ICD-10-PCS | Mod: S$GLB,,, | Performed by: UROLOGY

## 2021-01-21 RX ORDER — CARVEDILOL 25 MG/1
TABLET ORAL
COMMUNITY
Start: 2020-11-28

## 2022-01-31 ENCOUNTER — OFFICE VISIT (OUTPATIENT)
Dept: UROLOGY | Facility: CLINIC | Age: 72
End: 2022-01-31
Attending: UROLOGY
Payer: MEDICARE

## 2022-01-31 VITALS
TEMPERATURE: 99 F | HEART RATE: 61 BPM | SYSTOLIC BLOOD PRESSURE: 161 MMHG | BODY MASS INDEX: 24.55 KG/M2 | RESPIRATION RATE: 18 BRPM | WEIGHT: 152.13 LBS | DIASTOLIC BLOOD PRESSURE: 69 MMHG

## 2022-01-31 DIAGNOSIS — C61 PROSTATE CANCER: Primary | ICD-10-CM

## 2022-01-31 LAB
BILIRUB SERPL-MCNC: NEGATIVE MG/DL
BLOOD URINE, POC: NEGATIVE
CLARITY, POC UA: CLEAR
COLOR, POC UA: YELLOW
GLUCOSE UR QL STRIP: NORMAL
KETONES UR QL STRIP: NEGATIVE
LEUKOCYTE ESTERASE URINE, POC: NEGATIVE
NITRITE, POC UA: NEGATIVE
PH, POC UA: 7
PROTEIN, POC: NEGATIVE
SPECIFIC GRAVITY, POC UA: 1.01
UROBILINOGEN, POC UA: NORMAL

## 2022-01-31 PROCEDURE — 99214 PR OFFICE/OUTPT VISIT, EST, LEVL IV, 30-39 MIN: ICD-10-PCS | Mod: S$GLB,,, | Performed by: UROLOGY

## 2022-01-31 PROCEDURE — 81002 URINALYSIS NONAUTO W/O SCOPE: CPT | Mod: S$GLB,,, | Performed by: UROLOGY

## 2022-01-31 PROCEDURE — 99214 OFFICE O/P EST MOD 30 MIN: CPT | Mod: S$GLB,,, | Performed by: UROLOGY

## 2022-01-31 PROCEDURE — 81002 POCT URINE DIPSTICK WITHOUT MICROSCOPE: ICD-10-PCS | Mod: S$GLB,,, | Performed by: UROLOGY

## 2022-01-31 NOTE — PROGRESS NOTES
Subjective:      Panchito Lebron is a 71 y.o. male who returns today regarding his prostate cancer.    Initial prostate biopsy (10/2016) = [place] 3+3 in 4 cores. Results and option discussed on the phone. He was subsequently lost to FU.     Underwent confirmatory biopsy in September 2018 with similar results. Remains on AS.     No concerns and c/o today. Overall doing well.    The following portions of the patient's history were reviewed and updated as appropriate: allergies, current medications, past family history, past medical history, past social history, past surgical history and problem list.    Review of Systems  A comprehensive multipoint review of systems was negative except as otherwise stated in the HPI.     Objective:   Vitals: BP (!) 161/69 (BP Location: Left arm, Patient Position: Sitting, BP Method: Large (Automatic))   Pulse 61   Temp 98.9 °F (37.2 °C) (Oral)   Resp 18   Wt 69 kg (152 lb 1.9 oz)   BMI 24.55 kg/m²     Physical Exam   General: alert and oriented, no acute distress  Respiratory: Symmetric expansion, non-labored breathing  Neuro: no gross deficits  Psych: normal judgment and insight, normal mood/affect and non-anxious    Lab Review   Urinalysis demonstrates negative for all components    PSA: (pt provided)  1/18/17 2.6  5/18/17 2.6  9/22/17 3.0  1/23/18 3.0  5/24/18 2.7  12/12/18 3.7  6/18/19 2.8  12/13/19 3.2  7/1/20  4.2  1/5/21  2.7  7/19/21 3.2  1/11/22 3.9    Pathology   Prostate Biopsy (9/24/18): [place] 3+3=6, 3 cores, up to 25% of core    Assessment and Plan:   1. Prostate cancer  -- Discussed AUA guidelines. With repeat biopsy and recent PSA levels, remains with low risk prostate cancer for which active surveillance is recommended treatment.  -- PSA remains stable  -- Will consider MRI and repeat biopsy in the future if PSA rises  -- PSA every 6 mos per PCP; FU 12 mos    I spent a total of 30 minutes on the day of the visit.  This includes face to face time and non-face to  face time preparing to see the patient (eg, review of tests), obtaining and/or reviewing separately obtained history, documenting clinical information in the electronic or other health record, independently interpreting results and communicating results to the patient/family/caregiver, or care coordinator.

## 2023-02-02 ENCOUNTER — OFFICE VISIT (OUTPATIENT)
Dept: UROLOGY | Facility: CLINIC | Age: 73
End: 2023-02-02
Attending: UROLOGY
Payer: MEDICARE

## 2023-02-02 VITALS
SYSTOLIC BLOOD PRESSURE: 159 MMHG | BODY MASS INDEX: 24.43 KG/M2 | WEIGHT: 152 LBS | HEART RATE: 64 BPM | DIASTOLIC BLOOD PRESSURE: 70 MMHG | HEIGHT: 66 IN

## 2023-02-02 DIAGNOSIS — C61 PROSTATE CANCER: Primary | ICD-10-CM

## 2023-02-02 PROCEDURE — 99214 PR OFFICE/OUTPT VISIT, EST, LEVL IV, 30-39 MIN: ICD-10-PCS | Mod: S$GLB,,, | Performed by: UROLOGY

## 2023-02-02 PROCEDURE — 99214 OFFICE O/P EST MOD 30 MIN: CPT | Mod: S$GLB,,, | Performed by: UROLOGY

## 2023-02-02 NOTE — PROGRESS NOTES
"  Subjective:      Panchito Lebron is a 72 y.o. male who returns today regarding his prostate cancer.    Initial prostate biopsy (10/2016) = [place] 3+3 in 4 cores. Results and option discussed on the phone. He was subsequently lost to FU.     Underwent confirmatory biopsy in September 2018 with similar results. Remains on AS.     No concerns and c/o today. Overall doing well.    The following portions of the patient's history were reviewed and updated as appropriate: allergies, current medications, past family history, past medical history, past social history, past surgical history and problem list.    Review of Systems  A comprehensive multipoint review of systems was negative except as otherwise stated in the HPI.     Objective:   Vitals: BP (!) 159/70   Pulse 64   Ht 5' 6" (1.676 m)   Wt 68.9 kg (152 lb)   BMI 24.53 kg/m²     Physical Exam   General: alert and oriented, no acute distress  Respiratory: Symmetric expansion, non-labored breathing  Neuro: no gross deficits  Psych: normal judgment and insight, normal mood/affect and non-anxious    Lab Review   Urinalysis demonstrates negative for all components    PSA: (pt provided)  1/18/17 2.6  5/18/17 2.6  9/22/17 3.0  1/23/18 3.0  5/24/18 2.7  12/12/18 3.7  6/18/19 2.8  12/13/19 3.2  7/1/20  4.2  1/5/21  2.7  7/19/21 3.2  1/11/22 3.9  7/1/22  3.8  12/12/22 3.4    Pathology   Prostate Biopsy (9/24/18): [place] 3+3=6, 3 cores, up to 25% of core    Assessment and Plan:   1. Prostate cancer  -- Discussed AUA guidelines. With repeat biopsy and recent PSA levels, remains with low risk prostate cancer for which active surveillance is recommended treatment.  -- PSA remains stable  -- Will consider MRI and repeat biopsy in the future if PSA rises  -- PSA every 6 mos per PCP; FU 12 mos  "

## 2024-02-01 ENCOUNTER — OFFICE VISIT (OUTPATIENT)
Dept: UROLOGY | Facility: CLINIC | Age: 74
End: 2024-02-01
Attending: UROLOGY
Payer: MEDICARE

## 2024-02-01 VITALS
HEART RATE: 66 BPM | SYSTOLIC BLOOD PRESSURE: 143 MMHG | TEMPERATURE: 97 F | WEIGHT: 152.13 LBS | DIASTOLIC BLOOD PRESSURE: 68 MMHG | HEIGHT: 66 IN | RESPIRATION RATE: 12 BRPM | OXYGEN SATURATION: 100 % | BODY MASS INDEX: 24.45 KG/M2

## 2024-02-01 DIAGNOSIS — C61 PROSTATE CANCER: Primary | ICD-10-CM

## 2024-02-01 PROCEDURE — 99214 OFFICE O/P EST MOD 30 MIN: CPT | Mod: S$GLB,,, | Performed by: UROLOGY

## 2024-02-01 NOTE — PROGRESS NOTES
"  Subjective:      Panchito Lebron is a 73 y.o. male who returns today regarding his prostate cancer.    Initial prostate biopsy (10/2016) = [place] 3+3 in 4 cores. Results and option discussed on the phone. He was subsequently lost to FU.     Underwent confirmatory biopsy in September 2018 with similar results. Remains on AS.     No concerns and c/o today. Overall doing well.    The following portions of the patient's history were reviewed and updated as appropriate: allergies, current medications, past family history, past medical history, past social history, past surgical history and problem list.    Review of Systems  A comprehensive multipoint review of systems was negative except as otherwise stated in the HPI.     Objective:   Vitals: BP (!) 143/68 (BP Location: Right arm, Patient Position: Sitting, BP Method: Small (Automatic))   Pulse 66   Temp 97 °F (36.1 °C) (Oral)   Resp 12   Ht 5' 6" (1.676 m)   Wt 69 kg (152 lb 1.9 oz)   SpO2 100%   BMI 24.55 kg/m²     Physical Exam   General: alert and oriented, no acute distress  Respiratory: Symmetric expansion, non-labored breathing  Neuro: no gross deficits  Psych: normal judgment and insight, normal mood/affect and non-anxious    Lab Review   Urinalysis demonstrates negative for all components    PSA: (pt provided)  1/18/17 2.6  5/18/17 2.6  9/22/17 3.0  1/23/18 3.0  5/24/18 2.7  12/12/18 3.7  6/18/19 2.8  12/13/19 3.2  7/1/20  4.2  1/5/21  2.7  7/19/21 3.2  1/11/22 3.9  7/1/22  3.8  12/12/22 3.4  1/8/23  3.4  8/1/23  3.2    Pathology   Prostate Biopsy (9/24/18): [place] 3+3=6, 3 cores, up to 25% of core    Assessment and Plan:   1. Prostate cancer  -- Discussed AUA guidelines. With repeat biopsy and recent PSA levels, remains with low risk prostate cancer for which active surveillance is recommended treatment.  -- PSA remains stable  -- Will consider MRI and repeat biopsy in the future if PSA rises  -- PSA every 6 mos per PCP; FU 12 mos    "

## 2024-02-06 ENCOUNTER — TELEPHONE (OUTPATIENT)
Dept: UROLOGY | Facility: CLINIC | Age: 74
End: 2024-02-06
Payer: MEDICARE

## 2024-02-06 NOTE — TELEPHONE ENCOUNTER
Returned call. All questions answered.     ----- Message from Fabiola Null sent at 2/6/2024  3:01 PM CST -----  Regarding: Test results  Type: Patient Call Back    Who called: p/t     What is the request in detail: p/t is calling to get his test results of his urinalysis. Please call to assist.     Can the clinic reply by [facility]?    Would the patient rather a call back or a response via [facility]?     Best call back number: [phone]    Additional Information:

## 2025-01-24 ENCOUNTER — TELEPHONE (OUTPATIENT)
Dept: UROLOGY | Facility: CLINIC | Age: 75
End: 2025-01-24
Payer: MEDICARE

## 2025-01-24 NOTE — TELEPHONE ENCOUNTER
----- Message from IDA Santillan sent at 1/24/2025  1:48 PM CST -----  Please schedule with Dr. Jimenez, Dr. Hudson, or Dr. Llanes.  Thanks!  ----- Message -----  From: Kelsie Castillo  Sent: 1/24/2025   1:25 PM CST  To: Jacque Lux RN    [phone] can I schedule him with Mimi he's a previous patient of   ----- Message -----  From: Jacque Lux RN  Sent: 1/24/2025   1:15 PM CST  To: Kelsie Castillo      ----- Message -----  From: Maria Isabel Lindquist  Sent: 1/24/2025  11:58 AM CST  To: [facility]; #    Who Called: Pt    What is the request in detail: Requesting call back to discuss scheduling appt, not early but not too late around Feb 1st. Please advise    Can the clinic reply by [facility]? No    Best Call Back Number: [phone]      Additional Information:

## 2025-02-27 ENCOUNTER — TELEPHONE (OUTPATIENT)
Dept: UROLOGY | Facility: CLINIC | Age: 75
End: 2025-02-27
Payer: MEDICARE

## 2025-02-27 NOTE — TELEPHONE ENCOUNTER
Staff called pt to reschedule appt with Dr. Llanes. Pt did not answer, staff left  instructing call back

## 2025-02-28 ENCOUNTER — TELEPHONE (OUTPATIENT)
Dept: UROLOGY | Facility: CLINIC | Age: 75
End: 2025-02-28
Payer: MEDICARE

## 2025-02-28 NOTE — TELEPHONE ENCOUNTER
Pt called staff to return missed call from yesterday. Pt was informed that appt needed to be rescheduled because Dr. Llanes will not be in clinic. Pt was reschedule with HUMBLE Le. Pt was informed of appt time/date and voiced understanding.

## 2025-04-10 ENCOUNTER — LAB VISIT (OUTPATIENT)
Dept: LAB | Facility: OTHER | Age: 75
End: 2025-04-10
Attending: INTERNAL MEDICINE
Payer: MEDICARE

## 2025-04-10 ENCOUNTER — OFFICE VISIT (OUTPATIENT)
Dept: UROLOGY | Facility: CLINIC | Age: 75
End: 2025-04-10
Payer: MEDICARE

## 2025-04-10 VITALS
OXYGEN SATURATION: 96 % | SYSTOLIC BLOOD PRESSURE: 150 MMHG | DIASTOLIC BLOOD PRESSURE: 76 MMHG | BODY MASS INDEX: 24.45 KG/M2 | HEIGHT: 66 IN | WEIGHT: 152.13 LBS | HEART RATE: 61 BPM

## 2025-04-10 DIAGNOSIS — C61 PROSTATE CANCER: Primary | ICD-10-CM

## 2025-04-10 DIAGNOSIS — R39.198 SLOW URINARY STREAM: ICD-10-CM

## 2025-04-10 DIAGNOSIS — R39.15 URINARY URGENCY: ICD-10-CM

## 2025-04-10 DIAGNOSIS — C61 PROSTATE CANCER: ICD-10-CM

## 2025-04-10 LAB
ANION GAP (OHS): 8 MMOL/L (ref 8–16)
BILIRUB SERPL-MCNC: NORMAL MG/DL
BLOOD URINE, POC: NORMAL
BUN SERPL-MCNC: 13 MG/DL (ref 8–23)
CALCIUM SERPL-MCNC: 9.6 MG/DL (ref 8.7–10.5)
CHLORIDE SERPL-SCNC: 106 MMOL/L (ref 95–110)
CLARITY, POC UA: CLEAR
CO2 SERPL-SCNC: 22 MMOL/L (ref 23–29)
COLOR, POC UA: YELLOW
CREAT SERPL-MCNC: 0.8 MG/DL (ref 0.5–1.4)
GFR SERPLBLD CREATININE-BSD FMLA CKD-EPI: >60 ML/MIN/1.73/M2
GLUCOSE SERPL-MCNC: 116 MG/DL (ref 70–110)
GLUCOSE UR QL STRIP: NORMAL
KETONES UR QL STRIP: NORMAL
LEUKOCYTE ESTERASE URINE, POC: NORMAL
NITRITE, POC UA: NORMAL
PH, POC UA: 5
POTASSIUM SERPL-SCNC: 4.4 MMOL/L (ref 3.5–5.1)
PROTEIN, POC: NORMAL
SODIUM SERPL-SCNC: 136 MMOL/L (ref 136–145)
SPECIFIC GRAVITY, POC UA: 1.02
UROBILINOGEN, POC UA: NORMAL

## 2025-04-10 PROCEDURE — 80048 BASIC METABOLIC PNL TOTAL CA: CPT

## 2025-04-10 PROCEDURE — 99214 OFFICE O/P EST MOD 30 MIN: CPT | Mod: S$GLB,,, | Performed by: NURSE PRACTITIONER

## 2025-04-10 PROCEDURE — 81002 URINALYSIS NONAUTO W/O SCOPE: CPT | Mod: S$GLB,,, | Performed by: NURSE PRACTITIONER

## 2025-04-10 PROCEDURE — 36415 COLL VENOUS BLD VENIPUNCTURE: CPT

## 2025-04-10 RX ORDER — TAMSULOSIN HYDROCHLORIDE 0.4 MG/1
0.4 CAPSULE ORAL DAILY
Qty: 30 CAPSULE | Refills: 11 | Status: SHIPPED | OUTPATIENT
Start: 2025-04-10 | End: 2026-04-10

## 2025-04-10 NOTE — PROGRESS NOTES
"Subjective:      Panchito Lebron is a 74 y.o. male who returns today regarding his prostate cancer.  Established patient of Dr. Mccrary's and new to me today.    Initial prostate biopsy (10/2016) = [place] 3+3 in 4 cores.  Underwent confirmatory biopsy in September 2018 with similar results. Remains on AS.      PSA 3.4 (2/6/24)  PSA 3.9 (2025)   2/5/24 3.4  8/1/23 3.2     Reports slow stream and feeling of ROSIBEL a times, now double voids. He denies dysuria, frequency, and gross hematuria. Occasional urgency.     The following portions of the patient's history were reviewed and updated as appropriate: allergies, current medications, past family history, past medical history, past social history, past surgical history and problem list.    Review of Systems  Constitutional: no fever or chills  ENT: no nasal congestion or sore throat  Respiratory: no cough or shortness of breath  Cardiovascular: no chest pain or palpitations  Gastrointestinal: no nausea or vomiting, tolerating diet  Genitourinary: as per HPI  Hematologic/Lymphatic: no easy bruising or lymphadenopathy  Musculoskeletal: no arthralgias or myalgias  Neurological: no seizures or tremors  Behavioral/Psych: no auditory or visual hallucinations     Objective:   Vitals:   Vitals:    04/10/25 1402   BP: (!) 150/76   Pulse: 61     Physical Exam   General: alert and oriented, no acute distress  Head: normocephalic, atraumatic  Neck: supple, normal ROM  Respiratory: Symmetric expansion, non-labored breathing  Cardiovascular: regular rate and rhythm  Abdomen: soft, non tender, non distended  Genitourinary: deferred  Skin: normal coloration and turgor, no rashes, no suspicious skin lesions noted  Neuro: alert and oriented x3, no gross deficits  Psych: normal judgment and insight, normal mood/affect, and non-anxious    Lab Review   Urinalysis demonstrates : negative for all components  No results found for: "WBC", "HGB", "HCT", "MCV", "PLT"  No results found for: " ""CREATININE", "BUN"  No results found for: "PSA"  Imaging   None    Assessment:     1. Prostate cancer    2. Slow urinary stream    3. Urinary urgency      Plan:   Diagnoses and all orders for this visit:    Prostate cancer  -     MRI Prostate W W/O Contrast; Future  -     Basic Metabolic Panel; Future    Slow urinary stream  -     tamsulosin (FLOMAX) 0.4 mg Cap; Take 1 capsule (0.4 mg total) by mouth once daily.    Urinary urgency    Plan:  --Trial of flomax for LUTS- discussed potential SE   --MRI prostate for further eval, follow up after imaging. Will likely recommend uronav TRUS/bx for surveillance     "

## 2025-04-22 ENCOUNTER — HOSPITAL ENCOUNTER (OUTPATIENT)
Dept: RADIOLOGY | Facility: HOSPITAL | Age: 75
Discharge: HOME | End: 2025-04-22
Attending: NURSE PRACTITIONER
Payer: MEDICARE

## 2025-04-22 DIAGNOSIS — C61 PROSTATE CANCER: ICD-10-CM

## 2025-04-22 PROCEDURE — 25500020 PHARM REV CODE 255: Performed by: NURSE PRACTITIONER

## 2025-04-22 PROCEDURE — 72197 MRI PELVIS W/O & W/DYE: CPT | Mod: TC

## 2025-04-22 PROCEDURE — 72197 MRI PELVIS W/O & W/DYE: CPT | Mod: 26,,, | Performed by: INTERNAL MEDICINE

## 2025-04-22 PROCEDURE — A9585 GADOBUTROL INJECTION: HCPCS | Performed by: NURSE PRACTITIONER

## 2025-04-22 RX ORDER — GADOBUTROL 604.72 MG/ML
10 INJECTION INTRAVENOUS
Status: COMPLETED | OUTPATIENT
Start: 2025-04-22 | End: 2025-04-22

## 2025-04-22 RX ADMIN — GADOBUTROL 10 ML: 604.72 INJECTION INTRAVENOUS at 02:04

## 2025-04-23 ENCOUNTER — RESULTS FOLLOW-UP (OUTPATIENT)
Dept: UROLOGY | Facility: CLINIC | Age: 75
End: 2025-04-23

## 2025-05-13 ENCOUNTER — TELEPHONE (OUTPATIENT)
Dept: UROLOGY | Facility: CLINIC | Age: 75
End: 2025-05-13
Payer: MEDICARE

## 2025-05-15 ENCOUNTER — OFFICE VISIT (OUTPATIENT)
Dept: UROLOGY | Facility: CLINIC | Age: 75
End: 2025-05-15
Payer: MEDICARE

## 2025-05-15 VITALS
BODY MASS INDEX: 24.45 KG/M2 | WEIGHT: 152.13 LBS | HEART RATE: 63 BPM | RESPIRATION RATE: 18 BRPM | DIASTOLIC BLOOD PRESSURE: 74 MMHG | HEIGHT: 66 IN | SYSTOLIC BLOOD PRESSURE: 161 MMHG

## 2025-05-15 DIAGNOSIS — C61 PROSTATE CANCER: Primary | ICD-10-CM

## 2025-05-15 PROCEDURE — 99214 OFFICE O/P EST MOD 30 MIN: CPT | Mod: S$GLB,,, | Performed by: NURSE PRACTITIONER

## 2025-05-15 NOTE — PROGRESS NOTES
"Subjective:      Panchito Lebron is a 74 y.o. male who returns today to review his MRI prostate. Established patient of Dr. Mccrary's.     Initial prostate biopsy (10/2016) = [place] 3+3 in 4 cores.  Underwent confirmatory biopsy in September 2018 with similar results. Remains on AS.      PSA 3.4 (2/6/24)  PSA 3.9 (2025)   2/5/24 3.4  8/1/23 3.2     Now taking flomax for LUTS.     Returns today with prostate MRI.       The following portions of the patient's history were reviewed and updated as appropriate: allergies, current medications, past family history, past medical history, past social history, past surgical history and problem list.    Review of Systems  Constitutional: no fever or chills  ENT: no nasal congestion or sore throat  Respiratory: no cough or shortness of breath  Cardiovascular: no chest pain or palpitations  Gastrointestinal: no nausea or vomiting, tolerating diet  Genitourinary: as per HPI  Hematologic/Lymphatic: no easy bruising or lymphadenopathy  Musculoskeletal: no arthralgias or myalgias  Neurological: no seizures or tremors  Behavioral/Psych: no auditory or visual hallucinations     Objective:   Vitals:   Vitals:    05/15/25 1311   BP: (!) 161/74   Pulse: 63   Resp: 18     Physical Exam   General: alert and oriented, no acute distress  Head: normocephalic, atraumatic  Neck: supple, normal ROM  Respiratory: Symmetric expansion, non-labored breathing  Cardiovascular: regular rate and rhythm  Abdomen: soft, non tender, non distended  Genitourinary: deferred  Skin: normal coloration and turgor, no rashes, no suspicious skin lesions noted  Neuro: alert and oriented x3, no gross deficits  Psych: normal judgment and insight, normal mood/affect, and non-anxious    Lab Review   Urinalysis demonstrates : no specimen  No results found for: "WBC", "HGB", "HCT", "MCV", "PLT"  Lab Results   Component Value Date    CREATININE 0.8 04/10/2025    BUN 13 04/10/2025     No results found for: "PSA"  Imaging "   (all images personally reviewed; agree with report below)  MRI prostate- MRI PROSTATE W W/O CONTRAST     CLINICAL HISTORY:  Prostate cancer, monitor;  Malignant neoplasm of prostate     TECHNIQUE:  MRI prostate in accordance with PI-RADS recommendations before and after intravenous administration of 10 mL Gadavist.     COMPARISON:  None.     FINDINGS:  PSA: 3.9 ng/mL in 2025.     Previous biopsy: Most recent biopsy in 2018 revealed adenocarcinoma in the left mid gland and apex, Los score 6.     Prior therapy: Active surveillance.     Prostate: 5.2 x 4.7 x 3.7 cm corresponding to a computed volume of 47 cc.     Peripheral zone: Atrophic.  No focal lesion concerning for prostate cancer.     Transitional zone: Benign prostatic hyperplasia. No focal lesion concerning for prostate cancer.     Neurovascular bundle: Normal appearance.     Seminal vesicles: Normal appearance.     Other pelvic organs: Bladder and rectum are unremarkable.  Sigmoid diverticulosis.     Lymph nodes: No lymphadenopathy.     Soft tissues/bones: There is a 0.8 cm hypointense lesion in the left posterior ilium, likely benign given PSA values.     Impression:     History of prostate cancer.     BPH and peripheral zone atrophy.  No suspicious lesion by imaging.     No regional lymphadenopathy.     Number of targets created for potential MR/US fusion biopsy:     Peripheral zone: 0     Transition zone: 0    Assessment:     1. 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